# Patient Record
Sex: MALE | Race: WHITE | HISPANIC OR LATINO | Employment: FULL TIME | URBAN - METROPOLITAN AREA
[De-identification: names, ages, dates, MRNs, and addresses within clinical notes are randomized per-mention and may not be internally consistent; named-entity substitution may affect disease eponyms.]

---

## 2022-10-08 ENCOUNTER — OFFICE VISIT (OUTPATIENT)
Dept: FAMILY MEDICINE CLINIC | Facility: CLINIC | Age: 32
End: 2022-10-08

## 2022-10-08 VITALS
SYSTOLIC BLOOD PRESSURE: 150 MMHG | TEMPERATURE: 97.8 F | WEIGHT: 180 LBS | OXYGEN SATURATION: 98 % | DIASTOLIC BLOOD PRESSURE: 100 MMHG | BODY MASS INDEX: 28.25 KG/M2 | HEIGHT: 67 IN | HEART RATE: 62 BPM | RESPIRATION RATE: 18 BRPM

## 2022-10-08 DIAGNOSIS — R03.0 ELEVATED BLOOD PRESSURE READING: Primary | ICD-10-CM

## 2022-10-08 PROCEDURE — 99203 OFFICE O/P NEW LOW 30 MIN: CPT | Performed by: FAMILY MEDICINE

## 2022-10-08 NOTE — PROGRESS NOTES
Name: Cinda Craig      : 1990      MRN: [id number]  Encounter Provider: Guero Graham MD  Encounter Date: 10/8/2022   Encounter department: [facility]     1  Elevated blood pressure reading  Assessment & Plan:  Blood Pressure: 150/100   Does endorse that he has headaches, for the last two weeks  Denies any chest pain, blurry vision, nausea or vomiting  Patient does also admit that he has home stressors and has been thinking about his family  No risk factors  Slightly overweight with BMI 28 19     PLAN:   - return in one week for recheck of blood pressures   - Will start on Amlodipine if bps continue to be elevated                Subjective       Patient presents today to establish care  PMH: NA  Medications: NA   Allergies: NA   Surg Hx:   Social Hx: Smokin cigarette  Alcohol: Weekend drinking 10 drinks at a time Drugs: None   Fam Hx: dad: htn, heart problems     Preventative care: influenza vaccine     Current Concerns: headache       Headache  Headache pattern:  Headache sometimes there, sometimes not at all  Initial event:  None  Frequency:  Daily  Providers seen:  None  Time of day symptoms are worse:  Night  Quality:  Stabbing  Location:  Back of head and behind eyes  Pain severity:  7  Changes in vision:  Blurred vision  Stomach/GI changes:  None  Changes in sensation:  None  Abortive medication frequency of use:  4 or more days a week  Abortive medication timing of administration:  When the pain is mild  Other abortive medications:  Medications from [facility]     Review of Systems   Constitutional: Negative for chills and fever  HENT: Negative for ear pain and sore throat  Eyes: Negative for pain and visual disturbance  Respiratory: Negative for cough and shortness of breath  Cardiovascular: Negative for chest pain and palpitations  Gastrointestinal: Negative for abdominal pain and vomiting  Genitourinary: Negative for dysuria and hematuria  Musculoskeletal: Negative for arthralgias and back pain  Skin: Negative for color change and rash  Neurological: Positive for headaches  Negative for seizures and syncope  All other systems reviewed and are negative  No past medical history on file  No past surgical history on file  No family history on file  Social History     Socioeconomic History   • Marital status: /Civil Union     Spouse name: Not on file   • Number of children: Not on file   • Years of education: Not on file   • Highest education level: Not on file   Occupational History   • Not on file   Tobacco Use   • Smoking status: Current Every Day Smoker   • Smokeless tobacco: Never Used   Substance and Sexual Activity   • Alcohol use: Yes   • Drug use: Never   • Sexual activity: Not on file   Other Topics Concern   • Not on file   Social History Narrative   • Not on file     Social Determinants of Health     Financial Resource Strain: Not on file   Food Insecurity: Not on file   Transportation Needs: Not on file   Physical Activity: Not on file   Stress: Not on file   Social Connections: Not on file   Intimate Partner Violence: Not on file   Housing Stability: Not on file     No current outpatient medications on file prior to visit  No Known Allergies    There is no immunization history on file for this patient  Objective     /100 (BP Location: Left arm, Patient Position: Sitting, Cuff Size: Large)   Pulse 62   Temp 97 8 °F (36 6 °C) (Temporal)   Resp 18   Ht 5' 7" (1 702 m)   Wt 81 6 kg (180 lb)   SpO2 98%   BMI 28 19 kg/m²     Physical Exam  Constitutional:       Appearance: Normal appearance  HENT:      Head: Normocephalic and atraumatic  Mouth/Throat:      Mouth: Mucous membranes are moist       Pharynx: Oropharynx is clear  Cardiovascular:      Rate and Rhythm: Normal rate and regular rhythm  Pulses: Normal pulses        Heart sounds: Normal heart sounds  Pulmonary:      Effort: Pulmonary effort is normal       Breath sounds: Normal breath sounds  Musculoskeletal:      Right lower leg: No edema  Left lower leg: No edema  Skin:     General: Skin is warm  Neurological:      Mental Status: He is alert and oriented to person, place, and time     Psychiatric:         Mood and Affect: Mood normal        Fernando Ritchie MD

## 2022-10-12 PROBLEM — R03.0 ELEVATED BLOOD PRESSURE READING: Status: ACTIVE | Noted: 2022-10-12

## 2022-10-12 NOTE — ASSESSMENT & PLAN NOTE
Blood Pressure: 150/100   Does endorse that he has headaches, for the last two weeks  Denies any chest pain, blurry vision, nausea or vomiting  Patient does also admit that he has home stressors and has been thinking about his family  No risk factors   Slightly overweight with BMI 28 19     PLAN:   - return in one week for recheck of blood pressures   - Will start on Amlodipine if bps continue to be elevated

## 2022-10-14 ENCOUNTER — TELEPHONE (OUTPATIENT)
Dept: FAMILY MEDICINE CLINIC | Facility: CLINIC | Age: 32
End: 2022-10-14

## 2022-10-14 NOTE — TELEPHONE ENCOUNTER
----- Message from Karine Yeung MD sent at 10/12/2022  1:15 PM EDT -----  Julio Cesar Frey,    Can we please schedule Mr Katia Serrano for a follow up for BP check  Thank you!

## 2022-10-15 ENCOUNTER — OFFICE VISIT (OUTPATIENT)
Dept: FAMILY MEDICINE CLINIC | Facility: CLINIC | Age: 32
End: 2022-10-15

## 2022-10-15 VITALS
WEIGHT: 180 LBS | HEIGHT: 67 IN | OXYGEN SATURATION: 98 % | SYSTOLIC BLOOD PRESSURE: 138 MMHG | RESPIRATION RATE: 18 BRPM | DIASTOLIC BLOOD PRESSURE: 86 MMHG | HEART RATE: 82 BPM | TEMPERATURE: 97.8 F | BODY MASS INDEX: 28.25 KG/M2

## 2022-10-15 DIAGNOSIS — R03.0 ELEVATED BLOOD PRESSURE READING: Primary | ICD-10-CM

## 2022-10-15 PROCEDURE — 99212 OFFICE O/P EST SF 10 MIN: CPT | Performed by: FAMILY MEDICINE

## 2022-10-15 NOTE — ASSESSMENT & PLAN NOTE
Blood Pressure: 138/86   Decreased from previous visit which was 150/100  Will not start medications at this time, but would like frequent follow up for bp check  Counseled for lifestyle and dietary modifications

## 2022-10-15 NOTE — PROGRESS NOTES
Name: Jacque Meigs      : 1990      MRN: [id number]  Encounter Provider: Devante Sweet MD  Encounter Date: 10/15/2022   Encounter department: [address] [place] [address]     1  Elevated blood pressure reading  Assessment & Plan:  Blood Pressure: 138/86   Decreased from previous visit which was 150/100  Will not start medications at this time, but would like frequent follow up for bp check  Counseled for lifestyle and dietary modifications  Subjective     Jacque Meigs is 28year old male with no significant pmhx presenting today for a follow up of blood pressure checks  Review of Systems   Constitutional: Negative for chills and fever  HENT: Negative for ear pain and sore throat  Eyes: Negative for pain and visual disturbance  Respiratory: Negative for cough and shortness of breath  Cardiovascular: Negative for chest pain and palpitations  Gastrointestinal: Negative for abdominal pain and vomiting  Genitourinary: Negative for dysuria and hematuria  Musculoskeletal: Negative for arthralgias and back pain  Skin: Negative for color change and rash  Neurological: Negative for seizures and syncope  All other systems reviewed and are negative  No past medical history on file  No past surgical history on file  No family history on file  Social History     Socioeconomic History   • Marital status: /Civil Union     Spouse name: None   • Number of children: None   • Years of education: None   • Highest education level: None   Occupational History   • None   Tobacco Use   • Smoking status: Current Every Day Smoker   • Smokeless tobacco: Never Used   Substance and Sexual Activity   • Alcohol use:  Yes   • Drug use: Never   • Sexual activity: None   Other Topics Concern   • None   Social History Narrative   • None     Social Determinants of Health     Financial Resource Strain: Low Risk    • Difficulty of Paying Living Expenses: Not very hard   Food Insecurity: No Food Insecurity   • Worried About Running Out of Food in the Last Year: Never true   • Ran Out of Food in the Last Year: Never true   Transportation Needs: No Transportation Needs   • Lack of Transportation (Medical): No   • Lack of Transportation (Non-Medical): No   Physical Activity: Not on file   Stress: Not on file   Social Connections: Not on file   Intimate Partner Violence: Not on file   Housing Stability: Not on file     No current outpatient medications on file prior to visit  No Known Allergies    There is no immunization history on file for this patient  Objective     /86 (BP Location: Right arm, Patient Position: Sitting, Cuff Size: Standard)   Pulse 82   Temp 97 8 °F (36 6 °C) (Temporal)   Resp 18   Ht 5' 7" (1 702 m)   Wt 81 6 kg (180 lb)   SpO2 98%   BMI 28 19 kg/m²     Physical Exam  Vitals and nursing note reviewed  Constitutional:       Appearance: Normal appearance  HENT:      Head: Normocephalic and atraumatic  Mouth/Throat:      Mouth: Mucous membranes are moist       Pharynx: Oropharynx is clear  Cardiovascular:      Rate and Rhythm: Normal rate and regular rhythm  Pulses: Normal pulses  Heart sounds: Normal heart sounds  Pulmonary:      Effort: Pulmonary effort is normal       Breath sounds: Normal breath sounds  Skin:     General: Skin is warm  Neurological:      Mental Status: He is alert and oriented to person, place, and time     Psychiatric:         Mood and Affect: Mood normal        Elena Alexis MD

## 2024-12-06 ENCOUNTER — OFFICE VISIT (OUTPATIENT)
Dept: DENTISTRY | Facility: CLINIC | Age: 34
End: 2024-12-06

## 2024-12-06 DIAGNOSIS — Z01.21 ENCOUNTER FOR DENTAL EXAMINATION AND CLEANING WITH ABNORMAL FINDINGS: Primary | ICD-10-CM

## 2024-12-06 PROCEDURE — D0140 LIMITED ORAL EVALUATION - PROBLEM FOCUSED: HCPCS | Performed by: DENTIST

## 2024-12-06 PROCEDURE — D4355 FULL MOUTH DEBRIDEMENT TO ENABLE A COMPREHENSIVE ORAL EVALUATION AND DIAGNOSIS ON A SUBSEQUENT VISIT: HCPCS

## 2024-12-06 NOTE — PROGRESS NOTES
Limited Ex, Gross Debridement   Patient presents on dental van [employer]  REVIEWED MED HX: meds, allergies, health changes reviewed in [facility]. All consents signed.  CHIEF CONCERN: *#3 is sensitive since last cleaning that was done in [place].  PAIN SCALE:  1  ASA CLASS:  II  PLAQUE:  mild  CALCULUS:  moderate  BLEEDING:   heavy  STAIN :   light      PERIO: cannot fully assess    Hygiene Procedures:  Scaled, Polished, Flossed and Used Cavitron    Oral Hygiene Instruction: Brushing Minimum 2x daily for 2 minutes, daily flossing    Dispensed: Toothbrush, Toothpaste, Floss    Visual and Tactile Intraoral/ Extraoral evaluation: Oral and Oropharyngeal cancer evaluation. No findings     Dr. Bellamy   Reviewed with patient clinical and radiographic findings and patient verbalized understanding. All questions and concerns addressed.     REFERRALS: none    CARIES FINDINGS:   3 M  5 DO    Other areas of incipient lesions, re-evaluate.     Full and xrays recommended to assess perio cond and the need for SRPs       TREATMENT  PLAN :   NV: Restos/amxi and fmx    Last BWX: 0  Last Panorex/ FMX : 0